# Patient Record
(demographics unavailable — no encounter records)

---

## 2025-03-18 NOTE — PHYSICAL EXAM
[FreeTextEntry1] : Mild upper cervical and occipital palpation tenderness Full range of movement of the cervical spine although extremes of the lateral flexion do trigger some pain [General Appearance - Alert] : alert [General Appearance - In No Acute Distress] : in no acute distress [General Appearance - Well-Appearing] : healthy appearing [Oriented To Time, Place, And Person] : oriented to person, place, and time [Impaired Insight] : insight and judgment were intact [Affect] : the affect was normal [Memory Recent] : recent memory was not impaired [Person] : oriented to person [Place] : oriented to place [Time] : oriented to time [Concentration Intact] : normal concentrating ability [Visual Intact] : visual attention was ~T not ~L decreased [Fluency] : fluency intact [Comprehension] : comprehension intact [Past History] : adequate knowledge of personal past history [Cranial Nerves Optic (II)] : visual acuity intact bilaterally,  visual fields full to confrontation, pupils equal round and reactive to light [Cranial Nerves Oculomotor (III)] : extraocular motion intact [Cranial Nerves Trigeminal (V)] : facial sensation intact symmetrically [Cranial Nerves Facial (VII)] : face symmetrical [Cranial Nerves Vestibulocochlear (VIII)] : hearing was intact bilaterally [Cranial Nerves Glossopharyngeal (IX)] : tongue and palate midline [Cranial Nerves Accessory (XI - Cranial And Spinal)] : head turning and shoulder shrug symmetric [Cranial Nerves Hypoglossal (XII)] : there was no tongue deviation with protrusion [Motor Tone] : muscle tone was normal in all four extremities [Motor Strength] : muscle strength was normal in all four extremities [No Muscle Atrophy] : normal bulk in all four extremities [Paresis Pronator Drift Right-Sided] : no pronator drift on the right [Paresis Pronator Drift Left-Sided] : no pronator drift on the left [Sensation Tactile Decrease] : light touch was intact [Sensation Pain / Temperature Decrease] : pain and temperature was intact [Romberg's Sign] : Romberg's sign was negtive [Abnormal Walk] : normal gait [Balance] : balance was intact [Past-pointing] : there was no past-pointing [Tremor] : no tremor present [Coordination - Dysmetria Impaired Finger-to-Nose Bilateral] : not present [2+] : Patella left 2+ [PERRL With Normal Accommodation] : pupils were equal in size, round, reactive to light, with normal accommodation [Extraocular Movements] : extraocular movements were intact [Full Visual Field] : full visual field

## 2025-03-18 NOTE — REASON FOR VISIT
[Consultation] : a consultation visit [Language Line ] : provided by [employer]   [[id number]] : [place]

## 2025-03-18 NOTE — ASSESSMENT
[FreeTextEntry1] : Posterior head pains consisting of a pressure-like sensation Likely pain emanating from the cervical spine Her neurological examination is normal I am suggesting some physical therapy for the cervical region and we will see her back thereafter

## 2025-03-18 NOTE — HISTORY OF PRESENT ILLNESS
[FreeTextEntry1] : This 40-year-old woman was seen in neurological consultation today,  services used She reports about 2-month history of headaches She describes a pressure-like sensation in the back of the head involving the occipital and upper cervical region There is no associated nausea, vomiting, visual disturbance or photophobia Pain is there pretty much daily She uses Tylenol when it gets bad which helps a little Denies any precipitating or alleviating factors  Medical history otherwise unremarkable  Non-smoker, no alcohol use, she does housekeeping work